# Patient Record
Sex: MALE | Race: BLACK OR AFRICAN AMERICAN
[De-identification: names, ages, dates, MRNs, and addresses within clinical notes are randomized per-mention and may not be internally consistent; named-entity substitution may affect disease eponyms.]

---

## 2019-02-11 NOTE — NUR
ED Nurse Note:



ambulated in to ER due to rash and itching around the penile area. pt refused 
to give further information.

## 2019-02-11 NOTE — EMERGENCY ROOM REPORT
History of Present Illness


General


Chief Complaint:  Skin Rash/Abscess


Source:  Patient





Present Illness


HPI


28-year-old male patient presents the ER complaining of rash for the past 2 

weeks.  Reports rash is pruritic.  Denies pain. Denies bleeding. Denies burning 

sensation. Denies fever, vomiting chest pain, shortness of breath.  Denies 

history of diabetes.  Denies contacts with similar symptoms.  Reports has not 

taken medication for relief of symptoms.


Allergies:  


Coded Allergies:  


     No Known Allergies (Unverified , 10/28/12)





Patient History


Past Medical History:  see triage record


Reviewed Nursing Documentation:  PMH: Agreed; PSxH: Agreed





Nursing Documentation-PMH


Past Medical History:  No Stated History





Review of Systems


All Other Systems:  negative except mentioned in HPI





Physical Exam





Vital Signs








  Date Time  Temp Pulse Resp B/P (MAP) Pulse Ox O2 Delivery O2 Flow Rate FiO2


 


2/11/19 12:04 98.2 66 16 111/75 99 Room Air  








Sp02 EP Interpretation:  reviewed, normal


General Appearance:  well appearing, no apparent distress, alert, GCS 15, non-

toxic


Head:  normocephalic, atraumatic


Eyes:  bilateral eye normal inspection, bilateral eye PERRL


ENT:  hearing grossly normal, normal pharynx, no angioedema, normal voice, 

uvula midline, moist mucus membranes


Neck:  full range of motion


Respiratory:  lungs clear, normal breath sounds, no rhonchi, no respiratory 

distress, no accessory muscle use, no wheezing, speaking full sentences


Cardiovascular #1:  regular rate, rhythm, no edema


Skin:  other - Hyperpigmented macules noted with excoriations, no surrounding 

erythema or edema, no drainage, no tenderness palpation, no red streaking, no 

central clearing





Medical Decision Making


PA Attestation


Dr. Reich  is my supervising Physician whom patient management has been 

discussed with.


Diagnostic Impression:  


 Primary Impression:  


 Rash and other nonspecific skin eruption


ER Course


Pt. presents to the ED c/o rash.





Ddx considered but are not limited to atopic dermatitis, scabies, shingles, 

hives, urticaria, angiodema, allergic reaction, impetigo.





Vital signs: are WNL, pt. is afebrile





Ordered medication.





ER COURSE


Physical exam shows hyperpigmented excoriated rashes, will provide patient with 

topical medication for itching symptoms.


Followup with dermatology.


No erythema or edema, no signs of cellulitis or abscess requiring I&D or oral 

antibiotic treatment.


Will provide with topical treatment due to excoriations to prevent infection.


ER precautions given.





DISCHARGE: 





At this time pt. is stable for d/c to home. Patient resting comfortably, in no 

acute distress, nontoxic appearing.


 Will provide printed patient care instructions, and any necessary 

prescriptions. 


Care plan and follow up instructions have been discussed with the patient prior 

to discharge. 


Patient provided with list of healthcare clinics to establish primary care 

physician. 


Patient instructed to follow-up with primary care provider in 3 - 5 days.


Patient questions asked and answered.


ER precautions given. Patient instructed to return to ER immediately for any 

new or worsening of symptoms including but not limited to increasing SOB, 

persistent fever. 





- Please note that this Emergency Department Report was dictated using [employer] technology software, occasionally this can lead to 

erroneous entry secondary to interpretation by the dictation equipment.





Last Vital Signs








  Date Time  Temp Pulse Resp B/P (MAP) Pulse Ox O2 Delivery O2 Flow Rate FiO2


 


2/11/19 12:04 98.2 66 16 111/75 99 Room Air  








Disposition:  HOME, SELF-CARE


Condition:  Stable


Scripts


Bacitracin/Polymyxin B Sulfate (BACITRACIN-POLYMYXIN OINTMENT) 28.35 Gm 

Oint...g.


1 APPLIC TP BID, #28 GM


   Prov: Jerry Orellana         2/11/19 


Triamcinolone Acetonide (TRIAMCINOLONE ACETONIDE) 15 Gm Cream..g.


15 GM TP BID, #15 GM


   Prov: Jerry Orellana         2/11/19


Patient Instructions:  Rash





Additional Instructions:  


Followup with primary care provider in 3 -5 days. Request referral to 

dermatology as needed.


Do not scratch or itch. Apply cool compresses to affected area.


Wash all clothes and bedding.


Take medications as directed. 


Do not apply topical steroid medication to face or skin creases. 


SE Benadryl drowsiness, do not take prior to drinking, driving, operating heavy 

machinery. Take Claritin during the day and Benadryl at night for itching 

symptoms.


Patient questions asked and answered.


ER precautions given, patient instructed to return to ER immediately for any 

new or worsening of symptoms.





[facility] [place]


[phone]





[facility]


[phone]











Jerry Orellana Feb 11, 2019 12:27

## 2019-02-11 NOTE — NUR
ED Nurse Note:



A/Ox4. Pt is cleared by ARMANDO Merida. DC instruction and prescriptions given, pt 
verbalized understanding. IV/ID wristband removed. All belongings taken by pt. 
Denies pain at this time. Pt ambulated out of ER with steady gait.

## 2019-02-11 NOTE — NUR
-------------------------------------------------------------------------------

            *** Note dali in EDM - 02/11/19 at 1256 by [email or web address] ***             

-------------------------------------------------------------------------------

ED Nurse Note:



Ambulated in to ER due to dizziness and diarrhea. Denies CP and diarrhea. 
A/Ox4.

## 2019-03-02 NOTE — NUR
Ed Nurse Note:



pt is cleared to be DC per ER provider, pt discharge and aftercare instruction 
provided w/ prescription, pt education done via discussion and handout, pt 
advised to follow up with pcp or return to ED if sx worsen or new sx develop, 
pt verbalized understanding and agrees with plan, pt vss, ambulatory w/ steady 
gait, all belongiongs left w/ pt, wristband removed.

## 2019-03-02 NOTE — EMERGENCY ROOM REPORT
History of Present Illness


General


Chief Complaint:  Skin Rash/Abscess


Source:  Patient





Present Illness


HPI


29-year-old male with no significant past medical history here complaining of 

worsening pruritic rash all over his body.  Patient was diagnosed with eczema 3 

weeks ago and now complains of rashes all over his body denies pain.  Denies 

anaphylaxis, fever or chills, SOB, palpitation, chest pain, abdominal pain, 

nausea vomiting.  Denies exposure to new allergens such as animals, new food, 

and is using the same brand of marijuana as before.  Denies recent travel.


Allergies:  


Coded Allergies:  


     No Known Allergies (Unverified , 3/2/19)





Patient History


Past Medical History:  see triage record


Past Surgical History:  none


Pertinent Family History:  none


Immunizations:  UTD


Reviewed Nursing Documentation:  PMH: Agreed; PSxH: Agreed





Nursing Documentation-PMH


Past Medical History:  No Stated History





Review of Systems


All Other Systems:  negative except mentioned in HPI





Physical Exam





Vital Signs








  Date Time  Temp Pulse Resp B/P (MAP) Pulse Ox O2 Delivery O2 Flow Rate FiO2


 


3/2/19 13:06 97.9 88 18 123/84 99 Room Air  








Sp02 EP Interpretation:  reviewed, normal


General Appearance:  normal inspection, well appearing, no apparent distress, 

alert


Head:  normocephalic, atraumatic


Eyes:  bilateral eye normal inspection, bilateral eye PERRL


ENT:  normal ENT inspection, hearing grossly normal, normal pharynx, no 

angioedema


Neck:  normal inspection, full range of motion, supple


Respiratory:  lungs clear, normal breath sounds, no rhonchi


Cardiovascular #1:  normal inspection, normal peripheral pulses, regular rate, 

rhythm, no edema


Gastrointestinal:  normal inspection, normal bowel sounds, non tender, soft


Rectal:  deferred


Musculoskeletal:  normal inspection, back normal


Neurologic:  normal inspection, alert, oriented x3


Psychiatric:  normal inspection, judgement/insight normal


Skin:  palpation normal, well hydrated, rash - uritacaria on torso, legs, arms


Lymphatic:  normal inspection, no adenopathy





Medical Decision Making


PA Attestation


all diagnosis and treatment plans were reviewed and discussed with my 

supervising physician Sena


Diagnostic Impression:  


 Primary Impression:  


 Allergic reaction


 Additional Impression:  


 Eczema


ER Course


29-year-old male with no significant past medical history here complaining of 

worsening pruritic rash all over his body.  Patient was diagnosed with eczema 3 

weeks ago and now complains of rashes all over his body denies pain.  Denies 

anaphylaxis, fever or chills, SOB, palpitation, chest pain, abdominal pain, 

nausea vomiting.  Denies exposure to new allergens such as animals, new food, 

and is using the same brand of marijuana as before.  Denies recent travel.





Ddx considered but are not limited to and allergic reaction to unknown allergen

, eczema, anaphylaxis





Vital signs: are WNL, pt. is afebrile





H&PE are most consistent with eczema, allergic reaction to unknown allergen, 





ORDERS:Medrol Dosepak,  triamcinolone cream





ED INTERVENTIONS: None required at this time.








DISCHARGE: At this time pt. is stable for d/c to home. Will provide printed 

patient care instructions, and any necessary prescriptions. Care plan and 

follow up instructions have been discussed with the patient prior to discharge.





follow with a primary care provider worsening symptoms, anaphylaxis return to 

emergency





Last Vital Signs








  Date Time  Temp Pulse Resp B/P (MAP) Pulse Ox O2 Delivery O2 Flow Rate FiO2


 


3/2/19 13:06 97.9 88 18 123/84 99 Room Air  








Disposition:  HOME, SELF-CARE


Condition:  Stable


Scripts


Triamcinolone Acet (Triamcinolone Acetonide) 15 Gm Cream..g.


2 GM APPLIC BID, #15 GM


   Prov: Silvestre Slater         3/2/19 


Methylprednisolone (Methylprednisolone*) 4MG Dspk


4 MG ORAL AS DIRECTED for 6 Days, #21 EA 0 Refills


   Day 1: Two tablets before breakfast, one after lunch, one after


   dinner, and two at bedtime. If started late in the day, take all six


   tablets at once or divide into two or three doses, unless otherwise


   directed by prescriber.


   Day 2: One tablet before breakfast, one after lunch, one after dinner,


   and two at bedtime


   Day 3: One tablet before breakfast, one after lunch, one after dinner,


   and one at bedtime


   Day 4: One tablet before breakfast, one after lunch, and one at


   bedtime


   Day 5: One tablet before breakfast and one at bedtime


   Day 6: One tablet before breakfast


   Prov: Silvestre Slater         3/2/19


Patient Instructions:  Allergies, Easy-to-Read, Eczema





Additional Instructions:  


take medication as directed, as difficulty swallowing or breathing return to 

the emergency room, follow with primary care provider for allergy testing.  

Wash all clothes and bedding.  Avoid new detergents, shampoo, lotion, contact 

with animals.











Silvestre Slater Mar 2, 2019 13:22

## 2019-03-23 NOTE — EMERGENCY ROOM REPORT
History of Present Illness


General


Chief Complaint:  Skin Rash/Abscess


Source:  Patient





Present Illness


HPI


Patient initially presented with a rash February 11.  He was described as 

erythematous macules.  There is no urticaria at that time.  The patient was 

treated with triple antibiotic and topical steroids.  He was seen March 2 and 

was felt that this is an allergic reaction.  He was given a Medrol Dosepak.  

The patient's been taking Benadryl.  The rashes not improved.  He does complain 

of some itching.  There is no pain.  Denies fevers or chills.  There is no 

upper respiratory symptomatology, throat swelling, shortness of breath or 

wheezing.  There is no nausea, vomiting or diarrhea.  He denies dysuria.  He 

feels that Benadryl has helped a little bit but the rash is continued.  He 

denies prior allergies.  No fevers or chills, joint pain, dysuria or penile 

discharge.  No change in vision or eye pain.


Allergies:  


Coded Allergies:  


     No Known Allergies (Unverified , 3/2/19)





Patient History


Past Medical History:  see triage record


Social History:  Reports: smoking


Social History Narrative


Student


Reviewed Nursing Documentation:  PMH: Agreed; PSxH: Agreed





Nursing Documentation-PMH


Past Medical History:  No Stated History





Review of Systems


All Other Systems:  negative except mentioned in HPI





Physical Exam





Vital Signs








  Date Time  Temp Pulse Resp B/P (MAP) Pulse Ox O2 Delivery O2 Flow Rate FiO2


 


3/23/19 11:30 98.1 85 17 119/83 97 Room Air  








Sp02 EP Interpretation:  reviewed, normal


General Appearance:  well appearing, no apparent distress


Head:  normocephalic, atraumatic


Eyes:  bilateral eye normal inspection, bilateral eye PERRL


ENT:  hearing grossly normal, normal pharynx, normal voice, moist mucus 

membranes


Neck:  full range of motion, supple


Respiratory:  lungs clear, normal breath sounds, no respiratory distress, 

speaking full sentences


Cardiovascular #1:  regular rate, rhythm


Cardiovascular #2:  2+ radial (R)


Gastrointestinal:  normal inspection, scaphoid


Musculoskeletal:  back normal, digits/nails normal, gait/station normal, normal 

range of motion


Neurologic:  alert, oriented x3, normal gait, grossly normal


Psychiatric:  mood/affect normal


Skin:  other - Hypopigmented macules mainly on torso increased history 

distribution.  No obvious herald spot.  No urticaria.





Medical Decision Making


Diagnostic Impression:  


 Primary Impression:  


 Pityriasis


ER Course


Patient presents with persistent rash for at least a month.  Differential 

includes allergic reaction, tinea corporis, pityriasis, viral exanthem amongst 

others.  Rash appears consistent with pityriasis..  Discussed this with the 

patient.  As he does have some itching with this prednisone was prescribed 

along with Benadryl.





I discussed expected course.  Also suggested he follow-up with a dermatologist.





Patient stable for outpatient observation and treatment.





Last Vital Signs








  Date Time  Temp Pulse Resp B/P (MAP) Pulse Ox O2 Delivery O2 Flow Rate FiO2


 


3/23/19 12:51 98.2 81 19 111/82 98 Room Air  








Status:  improved


Disposition:  HOME, SELF-CARE


Condition:  Improved


Scripts


Diphenhydramine Hcl* (BENADRYL*) 25 Mg Capsule


25 MG ORAL Q6H PRN for Itching, #20 CAP


   Prov: Jerad Torrez MD         3/23/19 


Prednisone* (PREDNISONE*) 20 Mg Tablet


40 MG ORAL DAILY, #10 TAB


   Prov: Jerad Torrez MD         3/23/19


Referrals:  


NON PHYSICIAN (PCP)











Jerad Torrez MD Mar 23, 2019 12:45

## 2019-03-23 NOTE — NUR
ED Nurse Note:



pt walked in to ER c/o unresolved rash in general body. pt aao x4 and skin 
clean and intact. per pt, he has been [facility] ER due to same problem twice and it 
has not been resolved completely. pt received prescription and took the 
medicine from here but could not recall the name. pt reported itchness but 
denied pain on rash site. small area of small rash noted on Rt wrist.

## 2019-09-22 ENCOUNTER — HOSPITAL ENCOUNTER (EMERGENCY)
Dept: HOSPITAL 72 - EMR | Age: 29
Discharge: HOME | End: 2019-09-22
Payer: COMMERCIAL

## 2019-09-22 VITALS — BODY MASS INDEX: 21.2 KG/M2 | HEIGHT: 73 IN | WEIGHT: 160 LBS

## 2019-09-22 VITALS — DIASTOLIC BLOOD PRESSURE: 80 MMHG | SYSTOLIC BLOOD PRESSURE: 120 MMHG

## 2019-09-22 VITALS — DIASTOLIC BLOOD PRESSURE: 78 MMHG | SYSTOLIC BLOOD PRESSURE: 114 MMHG

## 2019-09-22 DIAGNOSIS — R21: Primary | ICD-10-CM

## 2019-09-22 DIAGNOSIS — L70.0: ICD-10-CM

## 2019-09-22 DIAGNOSIS — L72.3: ICD-10-CM

## 2019-09-22 PROCEDURE — 99282 EMERGENCY DEPT VISIT SF MDM: CPT

## 2019-09-22 NOTE — NUR
ED Nurse Note:

Patient walked into ED c/o skin rashes with boils located on his chest, patient 
states that this has been an ongoing issue however came due to the recent 
growth, patient also pesents with a knot on the posterior side of his head, 
complains of no pain. patient is alert and oriented x4, ambulatory with a 
steady gait, VSS

## 2019-09-22 NOTE — EMERGENCY ROOM REPORT
History of Present Illness


General


Chief Complaint:  Skin Rash/Abscess


Source:  Patient





Present Illness


HPI


30 YO male presents to the ED c/o rash on anterior chest progressive x 3 

months. Denies itching. reports initially came and went, however know are 

present constantly. Denies pain.  Pt. denies fevers, chills or swollen tender 

lymph nodes. Denies lesions/rashes elsewhere on the body. Denies new 

medications or body washes or creams. Denies swelling of the lips, tongue , 

throat or airway. Denies wheezing, or shortness of breath.  Denies recent travel

, recent illness or ill contacts.  denies blisters, oral lesions, or sloughing 

of the skin. Pt. reports on the back of his head he had a cyst I & D'd in the 

exact same spot and it has returned. Denies erythema, warmth or d/c.


Allergies:  


Coded Allergies:  


     No Known Allergies (Unverified , 3/2/19)





Patient History


Past Medical History:  see triage record


Past Surgical History:  none


Pertinent Family History:  none


Reviewed Nursing Documentation:  PMH: Agreed; PSxH: Agreed





Nursing Documentation-PMH


Past Medical History:  No Stated History





Review of Systems


All Other Systems:  negative except mentioned in HPI





Physical Exam





Vital Signs








  Date Time  Temp Pulse Resp B/P (MAP) Pulse Ox O2 Delivery O2 Flow Rate FiO2


 


9/22/19 16:57 98.1 92 16 114/78 (90) 95 Room Air  








Sp02 EP Interpretation:  reviewed, normal


General Appearance:  no apparent distress, alert, GCS 15, non-toxic


Head:  normocephalic, atraumatic


Eyes:  bilateral eye normal inspection, bilateral eye PERRL


ENT:  hearing grossly normal, no angioedema, normal voice, other - no stridor, 

no LAD


Neck:  full range of motion


Respiratory:  chest non-tender, lungs clear, normal breath sounds, no 

respiratory distress, no wheezing, speaking full sentences


Cardiovascular #1:  regular rate, rhythm, normal capillary refill


Musculoskeletal:  gait/station normal, normal range of motion, non-tender


Neurologic:  alert, oriented x3, responsive, motor strength/tone normal, 

sensory intact, normal gait, speech normal, grossly normal


Psychiatric:  judgement/insight normal


Skin:  rash - multiple discrete black heads noted on the midline anterior chest

, no erythema, warmth or tenderness, no D/c noted, no blisters or vesicles.  

0.6cm sebaceous cyst on posterior scalp without erythema or warmth, no D/C at 

this time.


Lymphatic:  no adenopathy





Medical Decision Making


PA Attestation


Dr. Reich  is my supervising Physician whom patient management has been 

discussed with.


Diagnostic Impression:  


 Primary Impression:  


 Rash and other nonspecific skin eruption


 Additional Impressions:  


 Cystic acne


 Sebaceous cyst


ER Course


30 YO male presents to the ED c/o rash on anterior chest progressive x 3 

months. Denies itching. reports initially came and went, however know are 

present constantly. Denies pain.  Pt. denies fevers, chills or swollen tender 

lymph nodes. Denies lesions/rashes elsewhere on the body. Denies new 

medications or body washes or creams. Denies swelling of the lips, tongue , 

throat or airway. Denies wheezing, or shortness of breath.  Denies recent travel

, recent illness or ill contacts.  denies blisters, oral lesions, or sloughing 

of the skin. Pt. reports on the back of his head he had a cyst I & D'd in the 

exact same spot and it has returned. Denies erythema, warmth or d/c. 





Ddx considered but are not limited to cellulitis, scabies, shingles, varicella, 

dermatitis, urticaria, eczema, tinea, viral exanthem, SJS





Vital signs: are WNL, pt. is afebrile


H&PE are most consistent with cystic acne.  No evidence of acute impending 

airway compromise or anaphylaxis.  No evidence of moderate infection/cellulitis.





ORDERS: none required at this time, the diagnosis is clinical





ED INTERVENTIONS: None required at this time. No acute bacterial infections. 





DISCHARGE: At this time pt. is stable for d/c to home. Will provide printed 

patient care instructions, and any necessary prescriptions. Care plan and 

follow up instructions have been discussed with the patient prior to discharge.





Last Vital Signs








  Date Time  Temp Pulse Resp B/P (MAP) Pulse Ox O2 Delivery O2 Flow Rate FiO2


 


9/22/19 17:05 98.1 89 16 114/78 95 Room Air  








Status:  improved


Disposition:  HOME, SELF-CARE


Condition:  Stable


Scripts


Benzoyl Peroxide (BP WASH) 113 Gm Cleanser


1 APPLIC TP DAILY, #113 GM


   Prov: Pearl Alvarez         9/22/19 


Clindamycin Phosphate (Clindamycin Phosphate) 75 Ml Gel.daily


1 APPLIC TP BID, #75 ML 2 Refills


   Prov: Pearl Alvarez         9/22/19


Patient Instructions:  Rash, Easy-to-Read





Additional Instructions:  


Take medications as directed. 





 ** Follow up with a Primary Care Provider in 3-5 days for DERMATOLOGY REFERRAL

, even if your symptoms have resolved. ** 


--Please review list of primary care clinics, if you do not already have a 

primary care provider





Return sooner to ED if new symptoms occur, or current symptoms become worse. 














- Please note that this Emergency Department Report was dictated using [employer] software, occasionally this can lead to 

erroneous entry secondary to interpretation by the dictation equipment.











Pearl Alvarez Sep 22, 2019 17:39